# Patient Record
Sex: FEMALE
[De-identification: names, ages, dates, MRNs, and addresses within clinical notes are randomized per-mention and may not be internally consistent; named-entity substitution may affect disease eponyms.]

---

## 2018-01-29 ENCOUNTER — HOSPITAL ENCOUNTER (EMERGENCY)
Dept: HOSPITAL 17 - NED | Age: 33
Discharge: LEFT BEFORE DISCHARGE | End: 2018-01-29
Payer: COMMERCIAL

## 2018-01-29 ENCOUNTER — HOSPITAL ENCOUNTER (EMERGENCY)
Dept: HOSPITAL 17 - NEPE | Age: 33
LOS: 1 days | Discharge: HOME | End: 2018-01-30
Payer: COMMERCIAL

## 2018-01-29 VITALS — BODY MASS INDEX: 29.38 KG/M2 | HEIGHT: 65 IN | WEIGHT: 176.37 LBS

## 2018-01-29 VITALS
RESPIRATION RATE: 16 BRPM | HEART RATE: 115 BPM | SYSTOLIC BLOOD PRESSURE: 104 MMHG | TEMPERATURE: 98.7 F | OXYGEN SATURATION: 99 % | DIASTOLIC BLOOD PRESSURE: 53 MMHG

## 2018-01-29 VITALS
HEART RATE: 82 BPM | TEMPERATURE: 98.5 F | SYSTOLIC BLOOD PRESSURE: 118 MMHG | DIASTOLIC BLOOD PRESSURE: 66 MMHG | RESPIRATION RATE: 18 BRPM | OXYGEN SATURATION: 99 %

## 2018-01-29 DIAGNOSIS — R10.2: ICD-10-CM

## 2018-01-29 DIAGNOSIS — O26.91: Primary | ICD-10-CM

## 2018-01-29 DIAGNOSIS — O03.9: Primary | ICD-10-CM

## 2018-01-29 DIAGNOSIS — Z3A.13: ICD-10-CM

## 2018-01-29 PROCEDURE — 81001 URINALYSIS AUTO W/SCOPE: CPT

## 2018-01-29 PROCEDURE — 96360 HYDRATION IV INFUSION INIT: CPT

## 2018-01-29 PROCEDURE — 99285 EMERGENCY DEPT VISIT HI MDM: CPT

## 2018-01-29 PROCEDURE — 85025 COMPLETE CBC W/AUTO DIFF WBC: CPT

## 2018-01-29 PROCEDURE — 76700 US EXAM ABDOM COMPLETE: CPT

## 2018-01-29 PROCEDURE — 87491 CHLMYD TRACH DNA AMP PROBE: CPT

## 2018-01-29 PROCEDURE — 80053 COMPREHEN METABOLIC PANEL: CPT

## 2018-01-29 PROCEDURE — 84702 CHORIONIC GONADOTROPIN TEST: CPT

## 2018-01-29 PROCEDURE — 99281 EMR DPT VST MAYX REQ PHY/QHP: CPT

## 2018-01-29 PROCEDURE — 87591 N.GONORRHOEAE DNA AMP PROB: CPT

## 2018-01-29 PROCEDURE — 88305 TISSUE EXAM BY PATHOLOGIST: CPT

## 2018-01-29 PROCEDURE — 86901 BLOOD TYPING SEROLOGIC RH(D): CPT

## 2018-01-29 PROCEDURE — 76817 TRANSVAGINAL US OBSTETRIC: CPT

## 2018-01-29 NOTE — PD
Physical Exam


Date Seen by Provider:  Jan 29, 2018


Time Seen by Provider:  14:19


Narrative


32-year-old female presents to the emergency department for evaluation of 

pelvic pain that started 3 days ago.  She reports being 13 weeks pregnant.  She 

denies any vaginal bleeding.  Current pain is 3/10.





Data


Data


Last Documented VS





Vital Signs








  Date Time  Temp Pulse Resp B/P (MAP) Pulse Ox O2 Delivery O2 Flow Rate FiO2


 


1/29/18 14:24 98.5 82 18 118/66 (83) 99 Room Air  








Orders





 Orders


Complete Blood Count With Diff (1/29/18 14:41)


Comprehensive Metabolic Panel (1/29/18 14:41)


Urinalysis - C+S If Indicated (1/29/18 14:41)


Ed Urine Pregnancytest Poc (1/29/18 14:41)


Beta Hcg (Quant/Titer) (1/29/18 14:41)








[facility]


Supervised Visit with MARYANNE:  No


Narrative Course


32-year-old female presents to the emergency department for evaluation of leg 

pain during pregnancy.  Patient is initially seen in triage.  She left AGAINST 

MEDICAL ADVICE before she can be moved to medical bed.


Diagnosis





 Primary Impression:  


 Left against medical advice


Scripts


No Active Prescriptions or Reported Meds


Disposition:  07 AGAINST MEDICAL ADVICE











Ban Escalera Jan 29, 2018 22:26

## 2018-01-30 LAB
ALBUMIN SERPL-MCNC: 3.9 GM/DL (ref 3.4–5)
ALP SERPL-CCNC: 93 U/L (ref 45–117)
ALT SERPL-CCNC: 20 U/L (ref 10–53)
AST SERPL-CCNC: 18 U/L (ref 15–37)
BASOPHILS # BLD AUTO: 0.1 TH/MM3 (ref 0–0.2)
BASOPHILS NFR BLD: 0.6 % (ref 0–2)
BILIRUB SERPL-MCNC: 0.4 MG/DL (ref 0.2–1)
BUN SERPL-MCNC: 8 MG/DL (ref 7–18)
CALCIUM SERPL-MCNC: 8.7 MG/DL (ref 8.5–10.1)
CHLORIDE SERPL-SCNC: 105 MEQ/L (ref 98–107)
COLOR UR: YELLOW
CREAT SERPL-MCNC: 0.64 MG/DL (ref 0.5–1)
EOSINOPHIL # BLD: 0.1 TH/MM3 (ref 0–0.4)
EOSINOPHIL NFR BLD: 0.8 % (ref 0–4)
ERYTHROCYTE [DISTWIDTH] IN BLOOD BY AUTOMATED COUNT: 13.7 % (ref 11.6–17.2)
GFR SERPLBLD BASED ON 1.73 SQ M-ARVRAT: 108 ML/MIN (ref 89–?)
GLUCOSE SERPL-MCNC: 108 MG/DL (ref 74–106)
GLUCOSE UR STRIP-MCNC: (no result) MG/DL
HCO3 BLD-SCNC: 25.2 MEQ/L (ref 21–32)
HCT VFR BLD CALC: 36.9 % (ref 35–46)
HGB BLD-MCNC: 12.9 GM/DL (ref 11.6–15.3)
HGB UR QL STRIP: (no result)
KETONES UR STRIP-MCNC: (no result) MG/DL
LYMPHOCYTES # BLD AUTO: 1.6 TH/MM3 (ref 1–4.8)
LYMPHOCYTES NFR BLD AUTO: 14.5 % (ref 9–44)
MCH RBC QN AUTO: 29.9 PG (ref 27–34)
MCHC RBC AUTO-ENTMCNC: 34.9 % (ref 32–36)
MCV RBC AUTO: 85.8 FL (ref 80–100)
MONOCYTE #: 0.5 TH/MM3 (ref 0–0.9)
MONOCYTES NFR BLD: 4.7 % (ref 0–8)
MUCOUS THREADS #/AREA URNS LPF: (no result) /LPF
NEUTROPHILS # BLD AUTO: 8.8 TH/MM3 (ref 1.8–7.7)
NEUTROPHILS NFR BLD AUTO: 79.4 % (ref 16–70)
NITRITE UR QL STRIP: (no result)
PLATELET # BLD: 443 TH/MM3 (ref 150–450)
PMV BLD AUTO: 6.6 FL (ref 7–11)
PROT SERPL-MCNC: 8.6 GM/DL (ref 6.4–8.2)
RBC # BLD AUTO: 4.3 MIL/MM3 (ref 4–5.3)
SODIUM SERPL-SCNC: 140 MEQ/L (ref 136–145)
SP GR UR STRIP: 1.03 (ref 1–1.03)
SQUAMOUS #/AREA URNS HPF: 1 /HPF (ref 0–5)
URINE LEUKOCYTE ESTERASE: (no result)
WBC # BLD AUTO: 11.1 TH/MM3 (ref 4–11)

## 2018-01-30 NOTE — PD
HPI


Chief Complaint:  Pregnancy Related Problem


Time Seen by Provider:  00:44


Travel History


International Travel<30 days:  No


Contact w/Intl Traveler<30days:  No


Traveled to known affect area:  No





History of Present Illness


HPI


The patient is a 32 year old female who presents to the [facility] 

emergency department with a history of vaginal bleeding in pregnancy that began 

yesterday.  The bleeding became heavier today.  She has had associated 

cramping. The pain is coming and going. She had an ultrasound today, and no 

fetus was identified therefore she was told that she is likely miscarrying.  

She reports concern as previously a fetus was noted on ultrasound.  The patient 

is unsure what her quantitative beta-hCG is.  She reports that the cramping has 

improved since she's been in the emergency department.  She also reports that 

the vaginal bleeding has improved.  Otherwise on review of systems, she denies 

having any recent fevers, cough, congestion, neck pain, chest pain, shortness 

of breath,  vomiting, diarrhea, urinary symptoms, or neurologic symptoms. 





LMP: 10/27, history of irregular periods.





Mnoy Trevino is the practitioner caring for the pregnancy.  She is a nurse 

midwife.





[facility]


Past Medical History


*** Narrative Medical


The patient's past medical history is significant for anemia.


Hx Anticoagulant Therapy:  No


Anemia:  Yes


Cardiovascular Problems:  No


Chemotherapy:  No


Cerebrovascular Accident:  No


Diabetes:  No


Diminished Hearing:  No


Respiratory:  No


Pregnant?:  Pregnant


:  1


Para:  1





Past Surgical History


*** Narrative Surgical


The patient's past surgical history is significant for a .


 Section:  Yes (1)


Hysterectomy:  No





Social History


Alcohol Use:  No


Tobacco Use:  No


Substance Use:  No





Allergies-Medications


(Allergen,Severity, Reaction):  


Coded Allergies:  


     No Known Allergies (Verified  Adverse Reaction, Unknown, 18)


Reported Meds & Prescriptions





Reported Meds & Active Scripts


Active


No Active Prescriptions or Reported Medications    








Review of Systems


Except as stated in HPI:  all other systems reviewed are Neg


General / Constitutional:  No: Fever


Eyes:  No: Visual changes


HENT:  No: Headaches


Cardiovascular:  No: Chest Pain or Discomfort


Respiratory:  No: Shortness of Breath


Gastrointestinal:  Positive: Abdominal Pain


Genitourinary:  Positive: Vaginal Bleeding, No: Dysuria


Musculoskeletal:  No: Pain


Skin:  No Rash


Neurologic:  No: Weakness


Psychiatric:  No: Depression


Endocrine:  No: Polydipsia


Hematologic/Lymphatic:  No: Easy Bruising





Physical Exam


Narrative


General: 


The patient is a well-developed well-nourished female in no acute distress. 





Head and Neck exam: 


Head is normocephalic atraumatic. 


Eyes: EOMI, pupils are equal round and reactive to light. 


Nose: Midline septum with pink mucous membranes 


Mouth: Dentition unremarkable. Moist mucus membranes. Posterior oropharynx is 

not erythematous. No tonsillar hypertrophy. Uvula midline. Airway patent. 


Neck: No palpable lymphadenopathy. No nuchal rigidity. No thyromegaly. 





Cardiovascular: 


Regular rate and rhythm without murmurs, gallops, or rubs. 





Lungs: 


Clear to auscultation bilaterally. No wheezes, rhonchi, or rales.


 


Abdomen:


Soft, without tenderness to palpation in all 4 quadrants of the abdomen. No 

guarding, rebound, or rigidity.  Normal bowel sounds are audible.  No 

tenderness on palpation of McBurney's point.  Negative Alaniz's sign.





Extremities: 


No clubbing, cyanosis, or edema. 2+ pulses in all 4 extremities. 





Back: 


No costovertebral angle tenderness to palpation. 





Neurologic Exam: Grossly nonfocal.





Skin Exam: No rash noted. Intact skin that is warm and dry. 





Gynecologic exam: 


The patient was placed in the dorsal lithotomy position. Her external genitalia 

were examined. She had no evidence of rash or lesions. 


The speculum was placed into her vagina and the cervix was identified.  The 

patient's cervical os was slightly open with tissue noted in the cervical os.  

This was gently able to be removed with ringed forceps.  A small amount of 

bleeding was noted following this.


On Bimanual exam: she has no cervical motion tenderness. No adnexal tenderness 

or prominence noted on palpation.  No significant palpable uterine enlargement 

is noted.  No uterine tenderness on palpation.





Data


Data


Last Documented VS





Vital Signs








  Date Time  Temp Pulse Resp B/P (MAP) Pulse Ox O2 Delivery O2 Flow Rate FiO2


 


18 05:23        


 


18 22:39 98.7 115 16  99 Room Air  








Orders





 Orders


Beta Hcg (Quant/Titer) (18 00:45)


Complete Blood Count With Diff (18 00:45)


Comprehensive Metabolic Panel (18 00:45)


Gc And Chlamydia Pcr (18 00:45)


Complete Rh (18 00:45)


Wet Prep Profile (18 00:45)


Urinalysis - C+S If Indicated (18 00:45)


Iv Access Insert/Monitor (18 00:45)


Ecg Monitoring (18 00:45)


Sodium Chlor 0.9% 1000 Ml Inj (Ns 1000 M (18 00:45)


Ed Poc Ultrasound (18 00:45)


Us Pelvis (Ques Pr/Ect)W Trans (18 02:04)





Labs





Laboratory Tests








Test


  18


00:55 18


02:40


 


White Blood Count 11.1 TH/MM3  


 


Red Blood Count 4.30 MIL/MM3  


 


Hemoglobin 12.9 GM/DL  


 


Hematocrit 36.9 %  


 


Mean Corpuscular Volume 85.8 FL  


 


Mean Corpuscular Hemoglobin 29.9 PG  


 


Mean Corpuscular Hemoglobin


Concent 34.9 % 


  


 


 


Red Cell Distribution Width 13.7 %  


 


Platelet Count 443 TH/MM3  


 


Mean Platelet Volume 6.6 FL  


 


Neutrophils (%) (Auto) 79.4 %  


 


Lymphocytes (%) (Auto) 14.5 %  


 


Monocytes (%) (Auto) 4.7 %  


 


Eosinophils (%) (Auto) 0.8 %  


 


Basophils (%) (Auto) 0.6 %  


 


Neutrophils # (Auto) 8.8 TH/MM3  


 


Lymphocytes # (Auto) 1.6 TH/MM3  


 


Monocytes # (Auto) 0.5 TH/MM3  


 


Eosinophils # (Auto) 0.1 TH/MM3  


 


Basophils # (Auto) 0.1 TH/MM3  


 


CBC Comment DIFF FINAL  


 


Differential Comment   


 


Urine Color YELLOW  


 


Urine Turbidity CLEAR  


 


Urine pH 6.5  


 


Urine Specific Gravity 1.029  


 


Urine Protein 30 mg/dL  


 


Urine Glucose (UA) NEG mg/dL  


 


Urine Ketones NEG mg/dL  


 


Urine Occult Blood LARGE  


 


Urine Nitrite NEG  


 


Urine Bilirubin NEG  


 


Urine Urobilinogen


  LESS THAN 2.0


MG/DL 


 


 


Urine Leukocyte Esterase SMALL  


 


Urine RBC  /hpf  


 


Urine WBC 2 /hpf  


 


Urine Squamous Epithelial


Cells 1 /hpf 


  


 


 


Urine Mucus FEW /lpf  


 


Microscopic Urinalysis Comment


  CULT NOT


INDICATED 


 


 


Chlamydia trachomatis DNA


(PCR) NOT DETECTED 


  


 


 


Neisseria gonorrhoeae DNA


(PCR) NOT DETECTED 


  


 


 


Blood Urea Nitrogen  8 MG/DL 


 


Creatinine  0.64 MG/DL 


 


Random Glucose  108 MG/DL 


 


Total Protein  8.6 GM/DL 


 


Albumin  3.9 GM/DL 


 


Calcium Level  8.7 MG/DL 


 


Alkaline Phosphatase  93 U/L 


 


Aspartate Amino Transf


(AST/SGOT) 


  18 U/L 


 


 


Alanine Aminotransferase


(ALT/SGPT) 


  20 U/L 


 


 


Total Bilirubin  0.4 MG/DL 


 


Sodium Level  140 MEQ/L 


 


Potassium Level  4.1 MEQ/L 


 


Chloride Level  105 MEQ/L 


 


Carbon Dioxide Level  25.2 MEQ/L 


 


Anion Gap  10 MEQ/L 


 


Estimat Glomerular Filtration


Rate 


  108 ML/MIN 


 


 


Human Chorionic Gonadotropin,


Quant 


  1167 MIU/ML 


 











MDM


Medical Decision Making


Medical Screen Exam Complete:  Yes


Emergency Medical Condition:  Yes


Medical Record Reviewed:  Yes


Interpretation(s)





Last Impressions








Pelvis Ultrasound 18 0204 Signed





Impressions: 





 Service Date/Time:  2018 01:26 - CONCLUSION:  1. 

Currently 





 no sonographic evidence for intrauterine pregnancy. No adnexal mass. Trace 

fluid 





 in the endometrial cavity and cul-de-sac.     Jose Valdez MD 








Differential Diagnosis


Ectopic pregnancy, versus miscarriage, versus subchorionic hemorrhage


Narrative Course


During the course of the patients emergency department visit, the patients 

history, examination, and differential diagnosis were reviewed with the 

patient. The patient was placed on a cardiac monitor with oximetry and frequent 

blood pressure monitoring. The patient had  IV access obtained and blood work 

sent for analysis. 





The patient was initially provided normal saline 1 L IV fluid bolus.





The patients laboratory studies were reviewed and remarkable for blood type is A

+, therefore RhoGAM is not indicated.  A white count of 11.1, hemoglobin 12.9, 

platelets 443 with neutrophils 79.4, quantitative beta hCG is low at 1167, 

glucose 108, total protein 8.6, urinalysis shows large occult blood, small 

leukocyte esterase, innumerable rbc's.





A point-of-care bedside ultrasound was done by me.  The patient had uterine 

enlargement without any visible fetus.  A formal radiological ultrasound with 

transvaginal route was also ordered.





Radiology studies were reviewed and remarkable for an ultrasound of the pelvis 

that shows currently no sonographic evidence for intrauterine pregnancy, no 

adnexal mass, trace fluid in the endometrial cavity and cul-de-sac.





The patient had a pelvic examination done and tissue was removed from her 

cervical os.  There was sent for products of conception.  A call was placed out 

to the patient's nurse midwife.  I spoke to the covering nurse practitioner who 

was familiar with the patient's case and will see the patient in follow-up 

today as an outpatient.





The patient is resting comfortably and feels better, is alert and in no 

distress. The patients results and examination findings were discussed with the 

patient. The repeat examination is unremarkable and benign. The history, exam, 

diagnostic testing, and current condition do not suggest any significant 

pathology to warrant further testing, continued ED treatment, admission, or 

surgical evaluation at this point. The vital signs have been stable. The 

patient does not have uncontrollable pain, intractable vomiting, or other 

significant symptoms. The patient's condition is stable and appropriate for 

discharge. The patient will pursue further outpatient evaluation with a primary 

care physician or other designated or consulting physician as indicated in the 

discharge instructions. The patient expressed understanding and was agreeable 

with this plan.





Physician Communication


Physician Communication


The patient's case including history, pertinent physical examination findings, 

and laboratory studies were discussed with Behiya Ringer's practitioner who was 

actually the provider that saw the patient in the office yesterday.  She does 

confirm that the patient has an appointment for follow-up today.  She is 

agreeable with the plan to discharge the patient follow-up as an outpatient 

today.





Diagnosis





 Primary Impression:  


 Miscarriage


Referrals:  


Obstetrician


1 day


Patient Instructions:  General Instructions, Miscarriage (ED)





***Additional Instructions:  


Follow-up with your nurse midwife for recheck later today as previously 

recommended.


Scripts


No Active Prescriptions or Reported Meds


Disposition:  01 DISCHARGE HOME


Condition:  Stable











Olga Lidia Walker MD 2018 01:43

## 2018-01-30 NOTE — RADRPT
EXAM DATE/TIME:  2018 01:26 

 

[facility] COMPARISON:     

No previous studies available for comparison.

        

 

 

INDICATIONS :     

Bleeding with pregnancy. 

                     

 

LAB(S):     

 

Beta-hC,167

                     

 

MEDICAL HISTORY :     

Pregnancy.     

 

SURGICAL HISTORY :     

 section.     

 

ENCOUNTER:     

Initial

 

ACUITY:     

2 days

 

PAIN SCORE:     

0/10

 

LOCATION:     

Bilateral pelvis 

                     

MEASUREMENTS:     

 

LEFT OVARY:                             

3.8 x 2.7 x 2.2 cm

 

UTERUS:                                  

10.7 x 5.9 x 8.1 cm

 

ENDOMETRIAL STRIPE:      

12 mm

 

RIGHT OVARY:                      

4.4 x 1.8 x 3.1 cm

 

FREE FLUID:                           

Yes   Trace in posterior cul-de-sac

 

CROWN RUMP LENGTH:     

None =  WKS  DAYS

 

FHR:                                         

None BPM

 

FINDINGS:     

Intrauterine pregnancy is identified. There is trace fluid in the endometrial cavity. Ovaries unremar
kable. No adnexal mass. Trace free fluid in the cul-de-sac.

 

CONCLUSION:     

1. Currently no sonographic evidence for intrauterine pregnancy. No adnexal mass. Trace fluid in the 
endometrial cavity and cul-de-sac.

 

 

 

 Jose Valdez MD on 2018 at 3:06           

Board Certified Radiologist.

 This report was verified electronically.